# Patient Record
Sex: MALE | Race: WHITE
[De-identification: names, ages, dates, MRNs, and addresses within clinical notes are randomized per-mention and may not be internally consistent; named-entity substitution may affect disease eponyms.]

---

## 2018-01-02 NOTE — DIAGNOSTIC IMAGING REPORT
PROCEDURE:

Frontal and lateral views of the chest.

 

COMPARISON: None.

 

INDICATIONS:   PRE-OP

     

FINDINGS:

Lines/tubes:  None.

 

Lungs:  The lungs are well inflated and clear. There is no evidence of 

pneumonia or pulmonary edema.

 

Pleura:  There is no pleural effusion or pneumothorax.

 

Heart and mediastinum:  The heart and the mediastinum are normal.

 

Bones:  No acute bony abnormality.

 

Upper abdomen: No free air under the diaphragm. Surgical clips overlie 

the abdomen on lateral view.

 

IMPRESSION: 

No acute cardiopulmonary disease.

 

 

Dictated by:  Flex Emerson M.D. on 1/02/2018 at 14:03     

Electronically approved by:  Flex Emerson M.D. on 1/02/2018 at 14:03

## 2018-01-04 ENCOUNTER — HOSPITAL ENCOUNTER (OUTPATIENT)
Dept: HOSPITAL 88 - OR | Age: 78
Discharge: HOME | End: 2018-01-04
Attending: UROLOGY
Payer: MEDICARE

## 2018-01-04 DIAGNOSIS — Z01.818: ICD-10-CM

## 2018-01-04 DIAGNOSIS — N32.89: Primary | ICD-10-CM

## 2018-01-04 DIAGNOSIS — Z85.51: ICD-10-CM

## 2018-01-04 DIAGNOSIS — Z01.810: ICD-10-CM

## 2018-01-04 DIAGNOSIS — C61: ICD-10-CM

## 2018-01-04 DIAGNOSIS — N30.40: ICD-10-CM

## 2018-01-04 DIAGNOSIS — I10: ICD-10-CM

## 2018-01-04 DIAGNOSIS — N35.9: ICD-10-CM

## 2018-01-04 PROCEDURE — 74420 UROGRAPHY RTRGR +-KUB: CPT

## 2018-01-04 PROCEDURE — 88112 CYTOPATH CELL ENHANCE TECH: CPT

## 2018-01-04 PROCEDURE — 71020: CPT

## 2018-01-04 PROCEDURE — 52281 CYSTOSCOPY AND TREATMENT: CPT

## 2018-01-04 PROCEDURE — 93005 ELECTROCARDIOGRAM TRACING: CPT

## 2018-01-04 NOTE — OPERATIVE REPORT
DATE OF PROCEDURE:  January 04, 2018 

 

PREOPERATIVE DIAGNOSES

1. Bladder mass.

2. History of bladder cancer.

3. Prostate cancer.



POSTOPERATIVE DIAGNOSES

1. Bladder mass.

2. History of bladder cancer.

3. Prostate cancer.

4. Urethral stricture disease.



PROCEDURES

1. Cystourethroscopy with calibration and dilation of urethral stricture 

(entirely separate procedure for urethral stricture disease).

2. Cystourethroscopy of the left ureter with calibration and left 

retrograde pyelogram (separate procedure for bladder cancer).

3. Cystourethroscopy of the right ureter with calibration and right 

retrograde pyelogram (separate procedure for bladder cancer).

4. Supervision of fluoroscopy.

5. Interpretation of retrograde pyelographies.



ANESTHESIA:  General.



ESTIMATED BLOOD LOSS:  Minimal.



COMPLICATIONS:  None.



INDICATIONS FOR PROCEDURE:  Mr. Anderson is a 77-year-old male who has 

previously had a transurethral resection of the bladder cancer in [place].  

He had prostate cancer and subsequent radiation.  He had an office 

cystoscopy, which revealed a very large mucinous mass with debris on the 

right side of the bladder by flexible cystoscopy.  He and I had a long 

discussion about the alternatives, risks and benefits including doing 

nothing, cystoscopy, biopsy in the operating room, retrograde pyelograms, 

etc.  He voiced understanding of options, alternatives, risks and benefits 

and elected to proceed for the OR.



PROCEDURE IN DETAIL:  After informed consent was obtained, the patient was 

taken to the operative suite.  He was placed supine on the operating table 

and underwent general anesthesia by the anesthesia service.  He was then 

placed in the dorsal lithotomy position and sterilely prepped and draped in 

the standard fashion for cystoscopy.  A 22.5-Sudanese cystoscope was inserted 

per urethra.  There was a bulbar urethral stricture, which was calibrated 

with 16-Sudanese and dilated with a 22.5-Sudanese cystoscope.  Panendoscopy of 

the bladder revealed a large amount of debris and mucus on the right side 

of the bladder.  This was evacuated with irrigation.  At the end of this, 

there appeared to be squamous metaplasia extending over much of the right 

side of the bladder.  The remainder of the bladder had neovascularity and 

power consistent with prior radiation cystitis.  There was no discrete mass 

seen after evacuation of all the mucus.  With the prior radiation and 

findings of radiation cystitis, I was concerned biopsy sites might not 

heal; however, we performed bladder washings and sent this for cytology.  

Bilateral retrograde pyelograms were performed, which were normal.  The 

bladder was drained.  The patient was awakened from anesthesia and 

transported to the recovery room in excellent condition.



SUPERVISION OF FLUOROSCOPY, INTERPRETATION OF RETROGRADE PYELOGRAPHY:  I 

was present throughout the entire procedure and I supervised the use of 

fluoroscopy.  There was no radiologist present at any time during this 

procedure.  Attention was turned toward the left and right ureteral 

orifices, which were catheterized with an 8-Sudanese cone-tipped catheter.  

In a retrograde fashion, contrast was injected revealing delicate ureters, 

delicate pelviceal systems, no evidence of filling defects and no evidence 

of hydronephrosis.





IMPRESSION:  Normal retrograde pyelograms.









DD:  01/04/2018 11:46

DT:  01/04/2018 14:40

Job#:  [id number]